# Patient Record
Sex: FEMALE | Race: WHITE
[De-identification: names, ages, dates, MRNs, and addresses within clinical notes are randomized per-mention and may not be internally consistent; named-entity substitution may affect disease eponyms.]

---

## 2017-11-14 ENCOUNTER — HOSPITAL ENCOUNTER (INPATIENT)
Dept: HOSPITAL 92 - L&D | Age: 37
LOS: 2 days | Discharge: HOME | End: 2017-11-16
Attending: OBSTETRICS & GYNECOLOGY | Admitting: OBSTETRICS & GYNECOLOGY
Payer: COMMERCIAL

## 2017-11-14 VITALS — BODY MASS INDEX: 28.6 KG/M2

## 2017-11-14 DIAGNOSIS — Z3A.40: ICD-10-CM

## 2017-11-14 DIAGNOSIS — G43.909: ICD-10-CM

## 2017-11-14 DIAGNOSIS — L40.9: ICD-10-CM

## 2017-11-14 DIAGNOSIS — F41.9: ICD-10-CM

## 2017-11-14 LAB
HCT VFR BLD CALC: 39.4 % (ref 36–47)
RBC # BLD AUTO: 4.14 MILL/UL (ref 4.2–5.4)
WBC # BLD AUTO: 8 THOU/UL (ref 4.8–10.8)

## 2017-11-14 PROCEDURE — 85027 COMPLETE CBC AUTOMATED: CPT

## 2017-11-14 PROCEDURE — 87340 HEPATITIS B SURFACE AG IA: CPT

## 2017-11-14 PROCEDURE — 86780 TREPONEMA PALLIDUM: CPT

## 2017-11-14 PROCEDURE — 10907ZC DRAINAGE OF AMNIOTIC FLUID, THERAPEUTIC FROM PRODUCTS OF CONCEPTION, VIA NATURAL OR ARTIFICIAL OPENING: ICD-10-PCS | Performed by: OBSTETRICS & GYNECOLOGY

## 2017-11-14 PROCEDURE — 3E0P3VZ INTRODUCTION OF HORMONE INTO FEMALE REPRODUCTIVE, PERCUTANEOUS APPROACH: ICD-10-PCS | Performed by: OBSTETRICS & GYNECOLOGY

## 2017-11-14 RX ADMIN — Medication SCH GM: at 07:47

## 2017-11-14 RX ADMIN — Medication PRN MLS: at 14:35

## 2017-11-14 RX ADMIN — Medication PRN MLS: at 16:00

## 2017-11-14 RX ADMIN — DOCUSATE CALCIUM SCH MG: 240 CAPSULE, LIQUID FILLED ORAL at 20:33

## 2017-11-14 RX ADMIN — Medication SCH: at 18:41

## 2017-11-14 NOTE — PDOC.OPDEL
OB Operative/Delivery Note


Delivery Dr/Surgeon: Bossman


Assist: n/a


Pre-Delivery Diagnosis: elective induction


Procedure/Post Delivery Dx: spontaneous vaginal delivery


Weeks gestation: 40


Anesthesia: epidural





- Findings


  ** A


Sex: female


Apgar - 1 min: 8


Apgar - 5 min: 9





- Additional Findings/Plan


Placenta delivered: spontaneous


Repaired Obstetrical Laceration: none


Estimated blood loss: 200


Post delivery plan: routine recovery

## 2017-11-14 NOTE — PDOC.LDHP
Labor and Delivery H&P


Chief complaint: scheduled induction


HPI: 





36yo  at 40w by 13w sono here for elective IOL.  No complaints.  good 

FM.  


Current gestational age (weeks): 40


Due date: 17


Dating criteria: second trimester ultrasound


Grav: 2


Para: 1


OB History Details: 





AMA- neg NIPT and NT sono


Current pregnancy complications: none


Abnormal US findings: No


Past Medical History: 





anxiety, psoriasis, migraine HA


Current medications: pre- vitamins, other (zantac)


Previous surgical history: dilation and curettage


Allergies/Adverse Reactions: 


 Allergies











Allergy/AdvReac Type Severity Reaction Status Date / Time


 


Penicillins Allergy Intermediate Rash Verified 16 21:35


 


Sulfa (Sulfonamide Allergy Intermediate Rash Verified 16 21:35





Antibiotics)     











Social history: none





- Physical Exam


Vital signs reviewed and normal: yes


General: NAD


Heart: RRR


Lungs: CTAB


Abdomen: gravid


Extremeties: no edema


FHT: category 1


Bock contractions every: q4min





- Vaginal Exam


cm dilated: 3


Effacement: 75%


Station: -2 (AROM scant clear)





- OB Labs


Blood type: A


RH: positive


Antibody Screen: negative


HIV: negative


RPR: negative


HEPSAg: negative


1 hour GCT: negative


GBS: positive


Rubella: immune





- Assessment


L&D Assessment: elective induction at term





- Plan


Plan: admit to L&D, labor augmentation if indicated, GBS antibiotic prophylaxis

, informed consent obtained, anesthesia consult for pain management

## 2017-11-15 RX ADMIN — DOCUSATE CALCIUM SCH MG: 240 CAPSULE, LIQUID FILLED ORAL at 20:45

## 2017-11-15 RX ADMIN — DOCUSATE CALCIUM SCH MG: 240 CAPSULE, LIQUID FILLED ORAL at 07:09

## 2017-11-15 NOTE — PDOC.PP
Post Partum Progress Note


Post Partum Day #: 1


PO intake tolerated: yes


Flatus: yes


Ambulation: yes


 Vital Signs (12 hours)











  Temp Pulse Resp BP


 


 11/15/17 12:05  97.6 F  64  20 


 


 11/15/17 12:00  99.6 F  68  18  118/62


 


 11/15/17 07:29  97.6 F  64  20 


 


 11/15/17 07:18  97.6 F  64  20  110/68








 Weight











Weight                         174 lb 12.8 oz

















- Physical Examination


General: NAD


Cardiovascular: RRR


Respiratory: clear to auscultation bilaterally, non-labored breathing


Abdominal: no distention, appropriately TTP


Fundus firm & at: umb


Skin: no rash


Psychiatric: normal affect


Result Diagrams: 


 11/14/17 07:15





Additional Labs: 


 Post Partum Labs











Hep Bs Antigen  Non-Reactive S/CO (NonReactive)   11/14/17  07:15    











(1) Vaginal delivery


Code(s): O80 - ENCOUNTER FOR FULL-TERM UNCOMPLICATED DELIVERY   Status: Acute   





- Assessment/Plan





Doing well PPD1


Breastfeeding


No issues


Rh pos Rimm


Home tomorrow

## 2017-11-16 VITALS — TEMPERATURE: 98.9 F | SYSTOLIC BLOOD PRESSURE: 122 MMHG | DIASTOLIC BLOOD PRESSURE: 72 MMHG

## 2017-11-16 RX ADMIN — DOCUSATE CALCIUM SCH MG: 240 CAPSULE, LIQUID FILLED ORAL at 08:28

## 2017-11-16 NOTE — PRG
DATE OF SERVICE:  11/15/2017

 

TIME OF VISIT:  Approximately 9:30 a.m.

 

SUBJECTIVE:  The patient has no complaints.  She has appropriate pain control, just cramping with jolynn
sing and bleeding is noting to be slowing down about the amount of a period.

 

OBJECTIVE:

GENERAL:  The patient is _____ .

LUNGS:  Clear to auscultation bilaterally.

ABDOMEN:  Soft, nontender, nondistended.  Fungus is at the level of the umbilicus.

EXTREMITIES:  No edema, cyanosis or clubbing.

 

ASSESSMENT AND PLAN:  A 37-year-old P2, status post term spontaneous vaginal delivery, uncomplicated.
  Vital signs are stable.  Patient is stable.  She is nursing and will continue to work on this.  She
 will continue postpartum care.  She is Rh positive, and rubella immune and plan for discharge tomorr
ow morning.

## 2017-11-16 NOTE — PDOC.PP
Post Partum Progress Note


Post Partum Day #: 2


PO intake tolerated: yes


Flatus: yes


Ambulation: yes


 Vital Signs (12 hours)











  Temp Pulse Resp BP


 


 11/16/17 08:10  98.9 F  75  14  122/72








 Weight











Weight                         174 lb 12.8 oz

















- Physical Examination


General: NAD


Cardiovascular: RRR


Respiratory: non-labored breathing


Abdominal: appropriately TTP


Fundus firm & at: umb-2


Skin: no rash


Psychiatric: normal affect


Result Diagrams: 


 11/14/17 07:15





Additional Labs: 


 Post Partum Labs











Hep Bs Antigen  Non-Reactive S/CO (NonReactive)   11/14/17  07:15    











(1) Vaginal delivery


Code(s): O80 - ENCOUNTER FOR FULL-TERM UNCOMPLICATED DELIVERY   Status: Acute   





- Assessment/Plan





VSSAF


No issues postpartum


Rh pos RImm


Breastfeeding


DC home FU 6 wk

## 2018-09-18 ENCOUNTER — HOSPITAL ENCOUNTER (EMERGENCY)
Dept: HOSPITAL 92 - ERS | Age: 38
Discharge: HOME | End: 2018-09-18
Payer: COMMERCIAL

## 2018-09-18 DIAGNOSIS — J02.0: Primary | ICD-10-CM

## 2018-09-18 LAB
ALBUMIN SERPL BCG-MCNC: 4.7 G/DL (ref 3.5–5)
ALP SERPL-CCNC: 67 U/L (ref 40–150)
ALT SERPL W P-5'-P-CCNC: 19 U/L (ref 8–55)
ANION GAP SERPL CALC-SCNC: 15 MMOL/L (ref 10–20)
AST SERPL-CCNC: 16 U/L (ref 5–34)
BASOPHILS # BLD AUTO: 0 THOU/UL (ref 0–0.2)
BASOPHILS NFR BLD AUTO: 0.1 % (ref 0–1)
BILIRUB SERPL-MCNC: 1 MG/DL (ref 0.2–1.2)
BUN SERPL-MCNC: 10 MG/DL (ref 7–18.7)
CALCIUM SERPL-MCNC: 9.6 MG/DL (ref 7.8–10.44)
CHLORIDE SERPL-SCNC: 103 MMOL/L (ref 98–107)
CO2 SERPL-SCNC: 24 MMOL/L (ref 22–29)
CREAT CL PREDICTED SERPL C-G-VRATE: 0 ML/MIN (ref 70–130)
CRYSTAL-AUWI FLAG: 0 (ref 0–15)
EOSINOPHIL # BLD AUTO: 0 THOU/UL (ref 0–0.7)
EOSINOPHIL NFR BLD AUTO: 0.3 % (ref 0–10)
GLOBULIN SER CALC-MCNC: 3.3 G/DL (ref 2.4–3.5)
GLUCOSE SERPL-MCNC: 105 MG/DL (ref 70–105)
HEV IGM SER QL: 5.8 (ref 0–7.99)
HGB BLD-MCNC: 15.6 G/DL (ref 12–16)
HYALINE CASTS #/AREA URNS LPF: (no result) LPF
LIPASE SERPL-CCNC: 9 U/L (ref 8–78)
LYMPHOCYTES # BLD: 0.7 THOU/UL (ref 1.2–3.4)
LYMPHOCYTES NFR BLD AUTO: 4.4 % (ref 21–51)
MCH RBC QN AUTO: 31 PG (ref 27–31)
MCV RBC AUTO: 90.3 FL (ref 78–98)
MONOCYTES # BLD AUTO: 0.6 THOU/UL (ref 0.11–0.59)
MONOCYTES NFR BLD AUTO: 3.5 % (ref 0–10)
NEUTROPHILS # BLD AUTO: 14.9 THOU/UL (ref 1.4–6.5)
NEUTROPHILS NFR BLD AUTO: 91.8 % (ref 42–75)
PATHC CAST-AUWI FLAG: 0.29 (ref 0–2.49)
PLATELET # BLD AUTO: 246 THOU/UL (ref 130–400)
POTASSIUM SERPL-SCNC: 3.7 MMOL/L (ref 3.5–5.1)
PREGS CONTROL BACKGROUND?: (no result)
PREGS CONTROL BAR APPEAR?: YES
PROT UR STRIP.AUTO-MCNC: 30 MG/DL
RBC # BLD AUTO: 5.03 MILL/UL (ref 4.2–5.4)
RBC UR QL AUTO: (no result) HPF (ref 0–3)
SODIUM SERPL-SCNC: 138 MMOL/L (ref 136–145)
SP GR UR STRIP: 1.02 (ref 1–1.04)
SPERM-AUWI FLAG: 0 (ref 0–9.9)
WBC # BLD AUTO: 16.2 THOU/UL (ref 4.8–10.8)
WBC UR QL AUTO: (no result) HPF (ref 0–3)
YEAST-AUWI FLAG: 0 (ref 0–25)

## 2018-09-18 PROCEDURE — 83690 ASSAY OF LIPASE: CPT

## 2018-09-18 PROCEDURE — 81015 MICROSCOPIC EXAM OF URINE: CPT

## 2018-09-18 PROCEDURE — 80053 COMPREHEN METABOLIC PANEL: CPT

## 2018-09-18 PROCEDURE — 87804 INFLUENZA ASSAY W/OPTIC: CPT

## 2018-09-18 PROCEDURE — 84703 CHORIONIC GONADOTROPIN ASSAY: CPT

## 2018-09-18 PROCEDURE — 87430 STREP A AG IA: CPT

## 2018-09-18 PROCEDURE — 96365 THER/PROPH/DIAG IV INF INIT: CPT

## 2018-09-18 PROCEDURE — 96361 HYDRATE IV INFUSION ADD-ON: CPT

## 2018-09-18 PROCEDURE — 81003 URINALYSIS AUTO W/O SCOPE: CPT

## 2018-09-18 PROCEDURE — 85025 COMPLETE CBC W/AUTO DIFF WBC: CPT

## 2018-09-18 PROCEDURE — 96375 TX/PRO/DX INJ NEW DRUG ADDON: CPT
